# Patient Record
Sex: FEMALE | URBAN - METROPOLITAN AREA
[De-identification: names, ages, dates, MRNs, and addresses within clinical notes are randomized per-mention and may not be internally consistent; named-entity substitution may affect disease eponyms.]

---

## 2024-01-08 ENCOUNTER — OFFICE VISIT (OUTPATIENT)
Dept: URBAN - METROPOLITAN AREA CLINIC 92 | Facility: CLINIC | Age: 34
End: 2024-01-08
Payer: COMMERCIAL

## 2024-01-08 ENCOUNTER — LAB OUTSIDE AN ENCOUNTER (OUTPATIENT)
Dept: URBAN - METROPOLITAN AREA CLINIC 92 | Facility: CLINIC | Age: 34
End: 2024-01-08

## 2024-01-08 VITALS
DIASTOLIC BLOOD PRESSURE: 66 MMHG | HEIGHT: 67 IN | HEART RATE: 79 BPM | TEMPERATURE: 97.2 F | WEIGHT: 187 LBS | BODY MASS INDEX: 29.35 KG/M2 | SYSTOLIC BLOOD PRESSURE: 107 MMHG

## 2024-01-08 DIAGNOSIS — R10.12 LUQ PAIN: ICD-10-CM

## 2024-01-08 DIAGNOSIS — R63.4 UNINTENTIONAL WEIGHT LOSS: ICD-10-CM

## 2024-01-08 DIAGNOSIS — R19.4 CHANGE IN BOWEL HABITS: ICD-10-CM

## 2024-01-08 DIAGNOSIS — R11.2 NAUSEA AND VOMITING, UNSPECIFIED VOMITING TYPE: ICD-10-CM

## 2024-01-08 PROBLEM — 79890006: Status: ACTIVE | Noted: 2024-01-08

## 2024-01-08 PROCEDURE — 99244 OFF/OP CNSLTJ NEW/EST MOD 40: CPT | Performed by: PHYSICIAN ASSISTANT

## 2024-01-08 PROCEDURE — 99204 OFFICE O/P NEW MOD 45 MIN: CPT | Performed by: PHYSICIAN ASSISTANT

## 2024-01-08 RX ORDER — CLONIDINE HYDROCHLORIDE 0.1 MG/1
TAKE 1 TABLET BY MOUTH AT BEDTIME TABLET ORAL
Qty: 30 EACH | Refills: 0 | Status: ACTIVE | COMMUNITY

## 2024-01-08 RX ORDER — LAMOTRIGINE 200 MG/1
TAKE 1 TABLET BY MOUTH AT BEDTIME TABLET ORAL
Qty: 30 EACH | Refills: 0 | Status: ACTIVE | COMMUNITY

## 2024-01-08 RX ORDER — HYDROXYZINE PAMOATE 50 MG/1
TAKE 1 CAPSULE BY MOUTH AT BEDTIME CAPSULE ORAL
Qty: 30 EACH | Refills: 0 | Status: ACTIVE | COMMUNITY

## 2024-01-08 RX ORDER — TRAZODONE HYDROCHLORIDE 100 MG/1
TABLET ORAL
Qty: 30 TABLET | Status: ACTIVE | COMMUNITY

## 2024-01-08 RX ORDER — ETONOGESTREL 68 MG/1
AS DIRECTED IMPLANT SUBCUTANEOUS
Status: ACTIVE | COMMUNITY

## 2024-01-08 RX ORDER — NORETHINDRONE 0.35 MG/1
TAKE 1 TABLET BY MOUTH ONCE DAILY TAKE AS NEEDED FOR BLEEDING CONTROL TABLET ORAL
Qty: 84 EACH | Refills: 2 | Status: ACTIVE | COMMUNITY

## 2024-01-08 RX ORDER — ONDANSETRON HYDROCHLORIDE 4 MG/1
TABLET, FILM COATED ORAL
Qty: 20 TABLET | Status: ACTIVE | COMMUNITY

## 2024-01-08 RX ORDER — OMEPRAZOLE 40 MG/1
1 CAPSULE 30 MINUTES BEFORE MORNING MEAL CAPSULE, DELAYED RELEASE ORAL ONCE A DAY
Qty: 30 | OUTPATIENT
Start: 2024-01-08

## 2024-01-08 RX ORDER — CEPHALEXIN 500 MG/1
TAKE 1 CAPSULE BY MOUTH IN THE MORNING AND AT NOON AND IN THE EVENING AND AT BEDTIME FOR 10 DAYS DISCONTINUE LEVAQUIN CAPSULE ORAL
Qty: 40 EACH | Refills: 0 | Status: ACTIVE | COMMUNITY

## 2024-01-08 RX ORDER — LEVOFLOXACIN 500 MG/1
TABLET, FILM COATED ORAL
Qty: 5 TABLET | Status: DISCONTINUED | COMMUNITY

## 2024-01-08 NOTE — PHYSICAL EXAM GASTROINTESTINAL
Abdomen , soft, LUQ tenderness, nondistended , no guarding or rigidity , no masses palpable , normal bowel sounds , no hepatomegaly present

## 2024-01-08 NOTE — HPI-TODAY'S VISIT:
This patient was referred by MARIELA Carnes for an evaluation of  N/V or weight loss.  A copy of this will be sent to the referring provider. She notes since the end of October has been "getting sick" for 2-3 days, resolves for 2-3 days and then recurs. Notes episodes of feeling hot, sweating followed by nausea and vomiting or nausea and diarrhea or nausea, vomiting and diarrhea. Episodes last for 2-3 days and cant keep down foods or liquids. Has been using zofran daily and hasnt had nausea since. Assoc 10-15+# weight loss and intermittent pain in the LUQ, not related to N/V episodes, sharp in nature, no agg or allev factors. In between episodes has no N/V but has alternating constipation and diarrhea and persistent anorexia and early satiety Denies hematochezia, melena, hematemesis. Has heartburn occasionally but not related to these episodes. No dysphagia.  She uses THC capsules (marijuana card) for anxiety but doesnt seem to worsen sx and hasnt tried hot showers  Dad with GERD. She has a pmhx asthma, migraines, anxiety, depression. Admits to persistent mental health issues despite meds [facility] and normal CBC, CMP, lipase and CRP

## 2024-01-10 LAB
IMMUNOGLOBULIN A: 368
INTERPRETATION: (no result)
TISSUE TRANSGLUTAMINASE AB, IGA: <1
TSH W/REFLEX TO FT4: 3.3

## 2024-01-24 ENCOUNTER — OFFICE VISIT (OUTPATIENT)
Dept: URBAN - METROPOLITAN AREA CLINIC 23 | Facility: CLINIC | Age: 34
End: 2024-01-24

## 2024-02-15 ENCOUNTER — EGD (OUTPATIENT)
Dept: URBAN - METROPOLITAN AREA SURGERY CENTER 16 | Facility: SURGERY CENTER | Age: 34
End: 2024-02-15

## 2024-02-15 RX ORDER — HYDROXYZINE PAMOATE 50 MG/1
TAKE 1 CAPSULE BY MOUTH AT BEDTIME CAPSULE ORAL
Qty: 30 EACH | Refills: 0 | Status: ACTIVE | COMMUNITY

## 2024-02-15 RX ORDER — CLONIDINE HYDROCHLORIDE 0.1 MG/1
TAKE 1 TABLET BY MOUTH AT BEDTIME TABLET ORAL
Qty: 30 EACH | Refills: 0 | Status: ACTIVE | COMMUNITY

## 2024-02-15 RX ORDER — CEPHALEXIN 500 MG/1
TAKE 1 CAPSULE BY MOUTH IN THE MORNING AND AT NOON AND IN THE EVENING AND AT BEDTIME FOR 10 DAYS DISCONTINUE LEVAQUIN CAPSULE ORAL
Qty: 40 EACH | Refills: 0 | Status: ACTIVE | COMMUNITY

## 2024-02-15 RX ORDER — OMEPRAZOLE 40 MG/1
TAKE 1 CAPSULE BY MOUTH ONCE DAILY 30 MINUTES BEFORE MORNING MEAL FOR 30 DAYS CAPSULE, DELAYED RELEASE ORAL
Qty: 30 CAPSULE | Refills: 3 | Status: ACTIVE | COMMUNITY

## 2024-02-15 RX ORDER — LAMOTRIGINE 200 MG/1
TAKE 1 TABLET BY MOUTH AT BEDTIME TABLET ORAL
Qty: 30 EACH | Refills: 0 | Status: ACTIVE | COMMUNITY

## 2024-02-15 RX ORDER — TRAZODONE HYDROCHLORIDE 100 MG/1
TABLET ORAL
Qty: 30 TABLET | Status: ACTIVE | COMMUNITY

## 2024-02-15 RX ORDER — NORETHINDRONE 0.35 MG/1
TAKE 1 TABLET BY MOUTH ONCE DAILY TAKE AS NEEDED FOR BLEEDING CONTROL TABLET ORAL
Qty: 84 EACH | Refills: 2 | Status: ACTIVE | COMMUNITY

## 2024-02-15 RX ORDER — ETONOGESTREL 68 MG/1
AS DIRECTED IMPLANT SUBCUTANEOUS
Status: ACTIVE | COMMUNITY

## 2024-02-15 RX ORDER — ONDANSETRON HYDROCHLORIDE 4 MG/1
TABLET, FILM COATED ORAL
Qty: 20 TABLET | Status: ACTIVE | COMMUNITY

## 2024-03-07 ENCOUNTER — LAB (OUTPATIENT)
Dept: URBAN - METROPOLITAN AREA CLINIC 4 | Facility: CLINIC | Age: 34
End: 2024-03-07
Payer: COMMERCIAL

## 2024-03-07 ENCOUNTER — COL/EGD (OUTPATIENT)
Dept: URBAN - METROPOLITAN AREA SURGERY CENTER 16 | Facility: SURGERY CENTER | Age: 34
End: 2024-03-07
Payer: COMMERCIAL

## 2024-03-07 DIAGNOSIS — K31.89 OTHER DISEASES OF STOMACH AND DUODENUM: ICD-10-CM

## 2024-03-07 DIAGNOSIS — R19.4 ALTERATION IN BOWEL ELIMINATION: ICD-10-CM

## 2024-03-07 PROCEDURE — 88305 TISSUE EXAM BY PATHOLOGIST: CPT | Performed by: PATHOLOGY

## 2024-03-07 PROCEDURE — 45378 DIAGNOSTIC COLONOSCOPY: CPT | Performed by: INTERNAL MEDICINE

## 2024-03-07 PROCEDURE — 88312 SPECIAL STAINS GROUP 1: CPT | Performed by: PATHOLOGY

## 2024-03-14 ENCOUNTER — OV HOSPF/U (OUTPATIENT)
Dept: URBAN - METROPOLITAN AREA CLINIC 92 | Facility: CLINIC | Age: 34
End: 2024-03-14
Payer: COMMERCIAL

## 2024-03-14 VITALS
TEMPERATURE: 97 F | BODY MASS INDEX: 27.37 KG/M2 | HEIGHT: 67 IN | DIASTOLIC BLOOD PRESSURE: 65 MMHG | HEART RATE: 89 BPM | WEIGHT: 174.4 LBS | SYSTOLIC BLOOD PRESSURE: 112 MMHG

## 2024-03-14 DIAGNOSIS — R10.12 LUQ PAIN: ICD-10-CM

## 2024-03-14 DIAGNOSIS — R63.4 UNINTENTIONAL WEIGHT LOSS: ICD-10-CM

## 2024-03-14 DIAGNOSIS — R63.0 ANOREXIA: ICD-10-CM

## 2024-03-14 DIAGNOSIS — R11.2 NAUSEA AND VOMITING, UNSPECIFIED VOMITING TYPE: ICD-10-CM

## 2024-03-14 DIAGNOSIS — R68.81 EARLY SATIETY: ICD-10-CM

## 2024-03-14 DIAGNOSIS — K58.1 IRRITABLE BOWEL SYNDROME WITH CONSTIPATION: ICD-10-CM

## 2024-03-14 DIAGNOSIS — R19.4 CHANGE IN BOWEL HABITS: ICD-10-CM

## 2024-03-14 PROBLEM — 440630006: Status: ACTIVE | Noted: 2024-03-14

## 2024-03-14 PROCEDURE — 99214 OFFICE O/P EST MOD 30 MIN: CPT

## 2024-03-14 RX ORDER — ETONOGESTREL 68 MG/1
AS DIRECTED IMPLANT SUBCUTANEOUS
Status: ACTIVE | COMMUNITY

## 2024-03-14 RX ORDER — ONDANSETRON HYDROCHLORIDE 4 MG/1
TABLET, FILM COATED ORAL
Qty: 20 TABLET | Status: ACTIVE | COMMUNITY

## 2024-03-14 RX ORDER — TRAZODONE HYDROCHLORIDE 100 MG/1
TABLET ORAL
Qty: 30 TABLET | Status: ACTIVE | COMMUNITY

## 2024-03-14 RX ORDER — CLONIDINE HYDROCHLORIDE 0.1 MG/1
TAKE 1 TABLET BY MOUTH AT BEDTIME TABLET ORAL
Qty: 30 EACH | Refills: 0 | Status: ACTIVE | COMMUNITY

## 2024-03-14 RX ORDER — CEPHALEXIN 500 MG/1
TAKE 1 CAPSULE BY MOUTH IN THE MORNING AND AT NOON AND IN THE EVENING AND AT BEDTIME FOR 10 DAYS DISCONTINUE LEVAQUIN CAPSULE ORAL
Qty: 40 EACH | Refills: 0 | Status: ACTIVE | COMMUNITY

## 2024-03-14 RX ORDER — LAMOTRIGINE 200 MG/1
TAKE 1 TABLET BY MOUTH AT BEDTIME TABLET ORAL
Qty: 30 EACH | Refills: 0 | Status: ACTIVE | COMMUNITY

## 2024-03-14 RX ORDER — HYDROXYZINE PAMOATE 50 MG/1
TAKE 1 CAPSULE BY MOUTH AT BEDTIME CAPSULE ORAL
Qty: 30 EACH | Refills: 0 | Status: ACTIVE | COMMUNITY

## 2024-03-14 RX ORDER — ONDANSETRON 4 MG/1
1 TABLET ON THE TONGUE AND ALLOW TO DISSOLVE TABLET, ORALLY DISINTEGRATING ORAL
Qty: 2 | Refills: 0 | Status: ACTIVE | COMMUNITY
Start: 2024-02-15

## 2024-03-14 RX ORDER — NORETHINDRONE 0.35 MG/1
TAKE 1 TABLET BY MOUTH ONCE DAILY TAKE AS NEEDED FOR BLEEDING CONTROL TABLET ORAL
Qty: 84 EACH | Refills: 2 | Status: ACTIVE | COMMUNITY

## 2024-03-14 NOTE — HPI-TODAY'S VISIT:
This patient was referred by MARIELA Carnes for an evaluation of  N/V or weight loss.  A copy of this will be sent to the referring provider. Previously seen by MARIELA Otero on 1/2024.   Initially, she noted since the end of October has been "getting sick" for 2-3 days, resolves for 2-3 days and then recurs. Notes episodes of feeling hot, sweating followed by nausea and vomiting or nausea and diarrhea or nausea, vomiting and diarrhea. Episodes last for 2-3 days and cant keep down foods or liquids. Has been using zofran daily and hasnt had nausea since. Assoc 10-15+# weight loss and intermittent pain in the LUQ, not related to N/V episodes, sharp in nature, no agg or allev factors. In between episodes has no N/V but has alternating constipation and diarrhea and persistent anorexia and early satiety Denies hematochezia, melena, hematemesis. Has heartburn occasionally but not related to these episodes. No dysphagia.  She uses THC capsules (marijuana card) for anxiety but doesnt seem to worsen sx and hasnt tried hot showers  Dad with GERD. She has a pmhx asthma, migraines, anxiety, depression. Admits to persistent mental health issues despite meds Labs done last week with PA at [place] and reviewed and normal CBC, CMP, lipase and CRP  CT A/P 1/16/24 revealed no acute inflammatory pathology.    EGD on 3/07/24 with Dr. Patrick revealed a normal esophagus and duodenum, erythematous mucosa in body and antrum, antral bx w/o significant abnormality.   Colonoscopy 3/07/24 revealed IH, otherwise normal, no specimens collected   Today, patient reports she went to the ED over the weekend due to severe lower abdominal pain. Notes of bad constipation that worsens her abdominal pain. Typically has a BM every few days. States she remains in the bathroom until she has a BM, once weekly. Patient notes of early satiety. Occasional nausea and vomiting, episodes occur 1-2 times weekly which last all day. She takes ondansetron as needed.   Went to pelvic floor therapy four years ago due to urinary incontinence.

## 2024-03-28 ENCOUNTER — OV EP (OUTPATIENT)
Dept: URBAN - METROPOLITAN AREA CLINIC 92 | Facility: CLINIC | Age: 34
End: 2024-03-28

## 2024-04-11 ENCOUNTER — OV EP (OUTPATIENT)
Dept: URBAN - METROPOLITAN AREA CLINIC 92 | Facility: CLINIC | Age: 34
End: 2024-04-11
Payer: COMMERCIAL

## 2024-04-11 VITALS
HEIGHT: 67 IN | SYSTOLIC BLOOD PRESSURE: 110 MMHG | TEMPERATURE: 97.8 F | DIASTOLIC BLOOD PRESSURE: 67 MMHG | WEIGHT: 175.8 LBS | BODY MASS INDEX: 27.59 KG/M2 | HEART RATE: 87 BPM

## 2024-04-11 DIAGNOSIS — R63.0 ANOREXIA: ICD-10-CM

## 2024-04-11 DIAGNOSIS — R11.2 NAUSEA AND VOMITING, UNSPECIFIED VOMITING TYPE: ICD-10-CM

## 2024-04-11 DIAGNOSIS — R68.81 EARLY SATIETY: ICD-10-CM

## 2024-04-11 DIAGNOSIS — R63.4 UNINTENTIONAL WEIGHT LOSS: ICD-10-CM

## 2024-04-11 DIAGNOSIS — R10.12 LUQ PAIN: ICD-10-CM

## 2024-04-11 DIAGNOSIS — K58.1 IRRITABLE BOWEL SYNDROME WITH CONSTIPATION: ICD-10-CM

## 2024-04-11 DIAGNOSIS — R19.4 CHANGE IN BOWEL HABITS: ICD-10-CM

## 2024-04-11 PROCEDURE — 99213 OFFICE O/P EST LOW 20 MIN: CPT

## 2024-04-11 RX ORDER — NORETHINDRONE 0.35 MG/1
TAKE 1 TABLET BY MOUTH ONCE DAILY TAKE AS NEEDED FOR BLEEDING CONTROL TABLET ORAL
Qty: 84 EACH | Refills: 2 | Status: ACTIVE | COMMUNITY

## 2024-04-11 RX ORDER — CEPHALEXIN 500 MG/1
TAKE 1 CAPSULE BY MOUTH IN THE MORNING AND AT NOON AND IN THE EVENING AND AT BEDTIME FOR 10 DAYS DISCONTINUE LEVAQUIN CAPSULE ORAL
Qty: 40 EACH | Refills: 0 | Status: ON HOLD | COMMUNITY

## 2024-04-11 RX ORDER — ETONOGESTREL 68 MG/1
AS DIRECTED IMPLANT SUBCUTANEOUS
Status: ACTIVE | COMMUNITY

## 2024-04-11 RX ORDER — TRAZODONE HYDROCHLORIDE 100 MG/1
TABLET ORAL
Qty: 30 TABLET | Status: ACTIVE | COMMUNITY

## 2024-04-11 RX ORDER — LINACLOTIDE 290 UG/1
1 CAPSULE AT LEAST 30 MINUTES BEFORE THE FIRST MEAL OF THE DAY ON AN EMPTY STOMACH CAPSULE, GELATIN COATED ORAL ONCE A DAY
Qty: 90 | Refills: 3 | OUTPATIENT
Start: 2024-04-11 | End: 2025-04-06

## 2024-04-11 RX ORDER — LAMOTRIGINE 200 MG/1
TAKE 1 TABLET BY MOUTH AT BEDTIME TABLET ORAL
Qty: 30 EACH | Refills: 0 | Status: ACTIVE | COMMUNITY

## 2024-04-11 RX ORDER — CLONIDINE HYDROCHLORIDE 0.1 MG/1
TAKE 1 TABLET BY MOUTH AT BEDTIME TABLET ORAL
Qty: 30 EACH | Refills: 0 | Status: ACTIVE | COMMUNITY

## 2024-04-11 RX ORDER — HYDROXYZINE PAMOATE 50 MG/1
TAKE 1 CAPSULE BY MOUTH AT BEDTIME CAPSULE ORAL
Qty: 30 EACH | Refills: 0 | Status: ACTIVE | COMMUNITY

## 2024-04-11 RX ORDER — ONDANSETRON 4 MG/1
1 TABLET ON THE TONGUE AND ALLOW TO DISSOLVE TABLET, ORALLY DISINTEGRATING ORAL
Qty: 2 | Refills: 0 | Status: ACTIVE | COMMUNITY
Start: 2024-02-15

## 2024-04-11 NOTE — HPI-TODAY'S VISIT:
Patient is a 33 year old female who presents in follow up from constipation. Previously seen by MARIELA Otero on 1/2024.   Initially, she noted since the end of October has been "getting sick" for 2-3 days, resolves for 2-3 days and then recurs. Notes episodes of feeling hot, sweating followed by nausea and vomiting or nausea and diarrhea or nausea, vomiting and diarrhea. Episodes last for 2-3 days and cant keep down foods or liquids. Has been using zofran daily and hasnt had nausea since. Assoc 10-15+# weight loss and intermittent pain in the LUQ, not related to N/V episodes, sharp in nature, no agg or allev factors. In between episodes has no N/V but has alternating constipation and diarrhea and persistent anorexia and early satiety Denies hematochezia, melena, hematemesis. Has heartburn occasionally but not related to these episodes. No dysphagia.  She uses THC capsules (marijuana card) for anxiety but doesnt seem to worsen sx and hasnt tried hot showers  Dad with GERD. She has a pmhx asthma, migraines, anxiety, depression. Admits to persistent mental health issues despite meds  CT A/P 1/16/24 revealed no acute inflammatory pathology.    EGD on 3/07/24 with Dr. Patrick revealed a normal esophagus and duodenum, erythematous mucosa in body and antrum, antral bx w/o significant abnormality. GES WNL.   Colonoscopy 3/07/24 revealed IH, otherwise normal, no specimens collected  Today, patient notes the night before she had a stressful work event and developed terrible LLQ pain, nausea, and vomiting. Aside from this episode she has felt well as her migraines and nausea resolved. BM are more regular  in frequency with Linzess 290mcg.  Previously, she had a had a BM every few days and would remain in the bathroom until she had a BM, once weekly.She admits she had two episodes of fecal incontinence a week after starting Linzess but doing well and would prefer to continue taking it. Denies hematochezia or melena. Believes her symptoms are exacerbated with stress. She is seeing a psychiatrist.   She started PFT two weeks ago for overactive bladder.

## 2024-06-04 ENCOUNTER — DASHBOARD ENCOUNTERS (OUTPATIENT)
Age: 34
End: 2024-06-04

## 2024-06-04 ENCOUNTER — OFFICE VISIT (OUTPATIENT)
Dept: URBAN - METROPOLITAN AREA CLINIC 92 | Facility: CLINIC | Age: 34
End: 2024-06-04
Payer: COMMERCIAL

## 2024-06-04 VITALS
SYSTOLIC BLOOD PRESSURE: 104 MMHG | HEIGHT: 67 IN | WEIGHT: 160.8 LBS | DIASTOLIC BLOOD PRESSURE: 61 MMHG | BODY MASS INDEX: 25.24 KG/M2 | TEMPERATURE: 97 F | HEART RATE: 92 BPM

## 2024-06-04 DIAGNOSIS — R10.12 LUQ PAIN: ICD-10-CM

## 2024-06-04 DIAGNOSIS — R11.2 NAUSEA AND VOMITING, UNSPECIFIED VOMITING TYPE: ICD-10-CM

## 2024-06-04 DIAGNOSIS — R10.32 LLQ ABDOMINAL PAIN: ICD-10-CM

## 2024-06-04 DIAGNOSIS — R68.81 EARLY SATIETY: ICD-10-CM

## 2024-06-04 DIAGNOSIS — R63.0 ANOREXIA: ICD-10-CM

## 2024-06-04 DIAGNOSIS — R19.4 CHANGE IN BOWEL HABITS: ICD-10-CM

## 2024-06-04 DIAGNOSIS — R63.4 UNINTENTIONAL WEIGHT LOSS: ICD-10-CM

## 2024-06-04 DIAGNOSIS — K58.1 IRRITABLE BOWEL SYNDROME WITH CONSTIPATION: ICD-10-CM

## 2024-06-04 PROCEDURE — 99213 OFFICE O/P EST LOW 20 MIN: CPT

## 2024-06-04 RX ORDER — NORETHINDRONE 0.35 MG/1
TAKE 1 TABLET BY MOUTH ONCE DAILY TAKE AS NEEDED FOR BLEEDING CONTROL TABLET ORAL
Qty: 84 EACH | Refills: 2 | Status: ACTIVE | COMMUNITY

## 2024-06-04 RX ORDER — LINACLOTIDE 290 UG/1
1 CAPSULE AT LEAST 30 MINUTES BEFORE THE FIRST MEAL OF THE DAY ON AN EMPTY STOMACH CAPSULE, GELATIN COATED ORAL ONCE A DAY
Qty: 90 | Refills: 3 | Status: ON HOLD | COMMUNITY
Start: 2024-04-11 | End: 2025-04-06

## 2024-06-04 RX ORDER — TRAZODONE HYDROCHLORIDE 100 MG/1
TABLET ORAL
Qty: 30 TABLET | Status: ACTIVE | COMMUNITY

## 2024-06-04 RX ORDER — ETONOGESTREL 68 MG/1
AS DIRECTED IMPLANT SUBCUTANEOUS
Status: ACTIVE | COMMUNITY

## 2024-06-04 RX ORDER — HYOSCYAMINE SULFATE 0.125 MG
1 TABLET ON THE TONGUE AND ALLOW TO DISSOLVE TABLET,DISINTEGRATING ORAL
Qty: 90 TABLET | Refills: 3 | OUTPATIENT
Start: 2024-06-04 | End: 2024-10-02

## 2024-06-04 RX ORDER — LAMOTRIGINE 200 MG/1
TAKE 1 TABLET BY MOUTH AT BEDTIME TABLET ORAL
Qty: 30 EACH | Refills: 0 | Status: ACTIVE | COMMUNITY

## 2024-06-04 RX ORDER — CLONIDINE HYDROCHLORIDE 0.1 MG/1
TAKE 1 TABLET BY MOUTH AT BEDTIME TABLET ORAL
Qty: 30 EACH | Refills: 0 | Status: ACTIVE | COMMUNITY

## 2024-06-04 RX ORDER — CEPHALEXIN 500 MG/1
TAKE 1 CAPSULE BY MOUTH IN THE MORNING AND AT NOON AND IN THE EVENING AND AT BEDTIME FOR 10 DAYS DISCONTINUE LEVAQUIN CAPSULE ORAL
Qty: 40 EACH | Refills: 0 | Status: ON HOLD | COMMUNITY

## 2024-06-04 RX ORDER — HYDROXYZINE PAMOATE 50 MG/1
TAKE 1 CAPSULE BY MOUTH AT BEDTIME CAPSULE ORAL
Qty: 30 EACH | Refills: 0 | Status: ACTIVE | COMMUNITY

## 2024-06-04 NOTE — HPI-TODAY'S VISIT:
Patient is a 33 year old female who presents in follow up from constipation. Previously seen by MARIELA Otero on 1/2024.   Initially, she noted since the end of October has been "getting sick" for 2-3 days, resolves for 2-3 days and then recurs. Notes episodes of feeling hot, sweating followed by nausea and vomiting or nausea and diarrhea or nausea, vomiting and diarrhea. Episodes last for 2-3 days and cant keep down foods or liquids. Has been using zofran daily and hasnt had nausea since. Assoc 10-15+# weight loss and intermittent pain in the LUQ, not related to N/V episodes, sharp in nature, no agg or allev factors. In between episodes has no N/V but has alternating constipation and diarrhea and persistent anorexia and early satiety Denies hematochezia, melena, hematemesis. Has heartburn occasionally but not related to these episodes. No dysphagia. She uses THC capsules (marijuana card) for anxiety but doesnt seem to worsen sx and hasnt tried hot shower. Dad with GERD. She has a pmhx asthma, migraines, anxiety, depression. Admits to persistent mental health issues despite meds.  CT A/P 1/16/24 revealed no acute inflammatory pathology.    EGD on 3/07/24 with Dr. Patrick revealed a normal esophagus and duodenum, erythematous mucosa in body and antrum, antral bx w/o significant abnormality. GES WNL.   Colonoscopy 3/07/24 revealed IH, otherwise normal, no specimens collected  4/11/24, patient notes the night before she had a stressful work event and developed terrible LLQ pain, nausea, and vomiting. Aside from this episode she has felt well as her migraines and nausea resolved. BM are more regular  in frequency with Linzess 290mcg.  Previously, she had a had a BM every few days and would remain in the bathroom until she had a BM, once weekly.She admits she had two episodes of fecal incontinence a week after starting Linzess but doing well and would prefer to continue taking it. Denies hematochezia or melena. Believes her symptoms are exacerbated with stress. She is seeing a psychiatrist.  She started PFT two weeks ago for overactive bladder.  Today, patient reports Linzess was initially of benefit but after a month of usage she developed terrible LLQ abominal pain and diarrhea. She took Imodium but it took about a week for her diarrhea to subside. Currently, having formed but soft BM once daily. Stool does not fall apart in toilet. Denies steatorrhea, hematochezia, or melena. She continues to experience LLQ pain which had initially improved after starting Linzess. Denies constitutional symptoms. Patient is having work up done by her GYN. She has a scheduled urodynamics exam. She uses a squatty potty and is walking for one hour every morning with her father.

## 2024-06-21 ENCOUNTER — WEB ENCOUNTER (OUTPATIENT)
Dept: URBAN - METROPOLITAN AREA CLINIC 92 | Facility: CLINIC | Age: 34
End: 2024-06-21

## 2024-07-11 ENCOUNTER — OFFICE VISIT (OUTPATIENT)
Dept: URBAN - METROPOLITAN AREA CLINIC 92 | Facility: CLINIC | Age: 34
End: 2024-07-11

## 2024-08-05 ENCOUNTER — OFFICE VISIT (OUTPATIENT)
Dept: URBAN - METROPOLITAN AREA CLINIC 92 | Facility: CLINIC | Age: 34
End: 2024-08-05

## 2024-08-25 ENCOUNTER — ERX REFILL RESPONSE (OUTPATIENT)
Dept: URBAN - METROPOLITAN AREA CLINIC 92 | Facility: CLINIC | Age: 34
End: 2024-08-25

## 2024-08-25 RX ORDER — OMEPRAZOLE 40 MG/1
TAKE 1 CAPSULE BY MOUTH ONCE DAILY 30  MINUTES  PRIOR  TO  MORNING  MEAL CAPSULE, DELAYED RELEASE ORAL
Qty: 30 CAPSULE | Refills: 1 | OUTPATIENT

## 2024-08-25 RX ORDER — OMEPRAZOLE 40 MG/1
TAKE 1 CAPSULE BY MOUTH ONCE DAILY 30  MINUTES  PRIOR  TO  MORNING  MEAL CAPSULE, DELAYED RELEASE ORAL
Qty: 30 CAPSULE | Refills: 0 | OUTPATIENT

## 2024-09-03 ENCOUNTER — OFFICE VISIT (OUTPATIENT)
Dept: URBAN - METROPOLITAN AREA CLINIC 92 | Facility: CLINIC | Age: 34
End: 2024-09-03

## 2024-09-27 ENCOUNTER — OFFICE VISIT (OUTPATIENT)
Dept: URBAN - METROPOLITAN AREA CLINIC 92 | Facility: CLINIC | Age: 34
End: 2024-09-27

## 2024-09-27 ENCOUNTER — OFFICE VISIT (OUTPATIENT)
Dept: URBAN - METROPOLITAN AREA TELEHEALTH 2 | Facility: TELEHEALTH | Age: 34
End: 2024-09-27
Payer: COMMERCIAL

## 2024-09-27 VITALS — HEIGHT: 67 IN

## 2024-09-27 DIAGNOSIS — R63.4 UNINTENTIONAL WEIGHT LOSS: ICD-10-CM

## 2024-09-27 DIAGNOSIS — R11.2 NAUSEA AND VOMITING, UNSPECIFIED VOMITING TYPE: ICD-10-CM

## 2024-09-27 DIAGNOSIS — R10.12 LUQ PAIN: ICD-10-CM

## 2024-09-27 DIAGNOSIS — R10.32 LLQ ABDOMINAL PAIN: ICD-10-CM

## 2024-09-27 DIAGNOSIS — K58.1 IRRITABLE BOWEL SYNDROME WITH CONSTIPATION: ICD-10-CM

## 2024-09-27 DIAGNOSIS — R63.0 ANOREXIA: ICD-10-CM

## 2024-09-27 DIAGNOSIS — R68.81 EARLY SATIETY: ICD-10-CM

## 2024-09-27 PROCEDURE — 99213 OFFICE O/P EST LOW 20 MIN: CPT | Performed by: PHYSICIAN ASSISTANT

## 2024-09-27 RX ORDER — CEPHALEXIN 500 MG/1
TAKE 1 CAPSULE BY MOUTH IN THE MORNING AND AT NOON AND IN THE EVENING AND AT BEDTIME FOR 10 DAYS DISCONTINUE LEVAQUIN CAPSULE ORAL
Qty: 40 EACH | Refills: 0 | Status: ON HOLD | COMMUNITY

## 2024-09-27 RX ORDER — OMEPRAZOLE 40 MG/1
TAKE 1 CAPSULE BY MOUTH ONCE DAILY 30  MINUTES  PRIOR  TO  MORNING  MEAL CAPSULE, DELAYED RELEASE ORAL
Qty: 30 CAPSULE | Refills: 0 | Status: ACTIVE | COMMUNITY

## 2024-09-27 RX ORDER — LINACLOTIDE 290 UG/1
1 CAPSULE AT LEAST 30 MINUTES BEFORE THE FIRST MEAL OF THE DAY ON AN EMPTY STOMACH CAPSULE, GELATIN COATED ORAL ONCE A DAY
Qty: 90 | Refills: 3 | Status: ON HOLD | COMMUNITY
Start: 2024-04-11 | End: 2025-04-06

## 2024-09-27 RX ORDER — HYOSCYAMINE SULFATE 0.125 MG
1 TABLET ON THE TONGUE AND ALLOW TO DISSOLVE TABLET,DISINTEGRATING ORAL
Qty: 90 TABLET | Refills: 3 | Status: ACTIVE | COMMUNITY
Start: 2024-06-04 | End: 2024-10-02

## 2024-09-27 RX ORDER — HYDROXYZINE PAMOATE 50 MG/1
TAKE 1 CAPSULE BY MOUTH AT BEDTIME CAPSULE ORAL
Qty: 30 EACH | Refills: 0 | Status: ACTIVE | COMMUNITY

## 2024-09-27 RX ORDER — HYOSCYAMINE SULFATE 0.125 MG
1 TABLET ON THE TONGUE AND ALLOW TO DISSOLVE TABLET,DISINTEGRATING ORAL
Qty: 90 TABLET | Refills: 3 | OUTPATIENT

## 2024-09-27 RX ORDER — LAMOTRIGINE 200 MG/1
TAKE 1 TABLET BY MOUTH AT BEDTIME TABLET ORAL
Qty: 30 EACH | Refills: 0 | Status: ACTIVE | COMMUNITY

## 2024-09-27 RX ORDER — ETONOGESTREL 68 MG/1
AS DIRECTED IMPLANT SUBCUTANEOUS
Status: ACTIVE | COMMUNITY

## 2024-09-27 RX ORDER — NORETHINDRONE 0.35 MG/1
TAKE 1 TABLET BY MOUTH ONCE DAILY TAKE AS NEEDED FOR BLEEDING CONTROL TABLET ORAL
Qty: 84 EACH | Refills: 2 | Status: ACTIVE | COMMUNITY

## 2024-09-27 RX ORDER — CLONIDINE HYDROCHLORIDE 0.1 MG/1
TAKE 1 TABLET BY MOUTH AT BEDTIME TABLET ORAL
Qty: 30 EACH | Refills: 0 | Status: ACTIVE | COMMUNITY

## 2024-09-27 RX ORDER — TRAZODONE HYDROCHLORIDE 100 MG/1
TABLET ORAL
Qty: 30 TABLET | Status: ACTIVE | COMMUNITY

## 2024-09-27 NOTE — HPI-TODAY'S VISIT:
Patient is a 34year old female who presents in follow up of constipation.   Initially, she noted since the end of October has been "getting sick" for 2-3 days, resolves for 2-3 days and then recurs. Notes episodes of feeling hot, sweating followed by nausea, vomiting or diarrhea as well as alternating days of constipation. Assoc 10-15+# weight loss and intermittent pain in the LUQ, not related to N/V episodes. Denied hematochezia, melena, hematemesis. Has heartburn occasionally but not related to these episodes. No dysphagia. She uses THC capsules (marijuana card) for anxiety but doesnt seem to worsen sx and hasnt tried hot shower. Dad with GERD.   CT A/P 1/16/24 revealed no acute inflammatory pathology.    EGD on 3/07/24 revealed a normal esophagus and duodenum, erythematous mucosa in body and antrum, bx neg GES WNL.   Colonoscopy 3/07/24 revealed IH, otherwise normal, no specimens collected  She was given linzess 290, PPI daily and is seeing a psychiatrist and started PFT for overactive bladder. She uses a squatty potty and is walking for one hour every morning with her father and now having BMs 2/day, formed and non-bloody. Pain, N/V resolved and no more gerd.  Weight stabilized. She is moving to [place]  Used [employer]

## 2024-09-27 NOTE — PHYSICAL EXAM SKIN:
no rashes , no suspicious lesions , no areas of discoloration Doxycycline Counseling:  Patient counseled regarding possible photosensitivity and increased risk for sunburn.  Patient instructed to avoid sunlight, if possible.  When exposed to sunlight, patients should wear protective clothing, sunglasses, and sunscreen.  The patient was instructed to call the office immediately if the following severe adverse effects occur:  hearing changes, easy bruising/bleeding, severe headache, or vision changes.  The patient verbalized understanding of the proper use and possible adverse effects of doxycycline.  All of the patient's questions and concerns were addressed.